# Patient Record
Sex: FEMALE
[De-identification: names, ages, dates, MRNs, and addresses within clinical notes are randomized per-mention and may not be internally consistent; named-entity substitution may affect disease eponyms.]

---

## 2020-06-28 ENCOUNTER — FORM ENCOUNTER (OUTPATIENT)
Age: 45
End: 2020-06-28

## 2020-08-31 ENCOUNTER — FORM ENCOUNTER (OUTPATIENT)
Age: 45
End: 2020-08-31

## 2020-10-29 ENCOUNTER — FORM ENCOUNTER (OUTPATIENT)
Age: 45
End: 2020-10-29

## 2021-11-19 PROBLEM — Z00.00 ENCOUNTER FOR PREVENTIVE HEALTH EXAMINATION: Status: ACTIVE | Noted: 2021-11-19

## 2022-01-03 ENCOUNTER — APPOINTMENT (OUTPATIENT)
Dept: OBGYN | Facility: CLINIC | Age: 47
End: 2022-01-03

## 2022-04-18 ENCOUNTER — APPOINTMENT (OUTPATIENT)
Dept: OBGYN | Facility: CLINIC | Age: 47
End: 2022-04-18

## 2023-01-10 ENCOUNTER — APPOINTMENT (OUTPATIENT)
Dept: OBGYN | Facility: CLINIC | Age: 48
End: 2023-01-10
Payer: COMMERCIAL

## 2023-01-10 ENCOUNTER — NON-APPOINTMENT (OUTPATIENT)
Age: 48
End: 2023-01-10

## 2023-01-10 VITALS
SYSTOLIC BLOOD PRESSURE: 122 MMHG | HEART RATE: 71 BPM | BODY MASS INDEX: 30.05 KG/M2 | HEIGHT: 64 IN | WEIGHT: 176 LBS | DIASTOLIC BLOOD PRESSURE: 81 MMHG

## 2023-01-10 DIAGNOSIS — Z01.419 ENCOUNTER FOR GYNECOLOGICAL EXAMINATION (GENERAL) (ROUTINE) W/OUT ABNORMAL FINDINGS: ICD-10-CM

## 2023-01-10 DIAGNOSIS — R39.15 URGENCY OF URINATION: ICD-10-CM

## 2023-01-10 PROCEDURE — 99386 PREV VISIT NEW AGE 40-64: CPT

## 2023-01-10 NOTE — HISTORY OF PRESENT ILLNESS
[FreeTextEntry1] : here for annual\par has urinary frequency , urgency, nocturia\par \par last tierney visit\par \par Patient\par Name	NANCI DAVILA (46yo, F) ID# [id number]	Appt. Date/Time	2020 02:10PM\par 	1975	Service Dept.	[facility]K OFFICE\par Provider	TRAM MULLEN MD\par Insurance	\par Med Primary: [facility] - [employer]\par Insurance # : [id number]\par Policy/Group # : [id number]\par Prescription: [employer] - This member could not be found in the payer's files. Please verify coverage and all member demographic information. details\par Chief Complaint\par F/U to surgery\par Patient's Care Team\par Primary Care Provider: KARMEN CALABRESE MD: [address] [place] [address], Ph [phone], Fax [phone] NPI: [id number]\par Patient's Pharmacies\par [facility] [place] [address] ([place]): [address] [place] [address], Ph [phone], Fax [phone]\par Vitals\par Ht:	5 ft 4 in 2020 03:03 pm\par BP:	120/76 sitting 2020 03:04 pm\par Allergies\par Reviewed Allergies\par PENICILLINS	\par Medications\par No medications reported\par Problems\par Reviewed Problems\par Family History\par Reviewed Family History\par Mother	- Diabetes mellitus\par  	- Hypertensive disorder\par  	- Hypercholesterolemia\par Father	- Glaucoma\par Social History\par Reviewed Social History\par Tobacco Smoking Status: Never smoker\par Most Recent Tobacco Use Screenin2020\par Surgical History\par Surgical History not reviewed (last reviewed 2020)\par none\par GYN History\par Reviewed GYN History\par Duration of Flow (days): 7 (Notes: 3-4 days bad pain).\par LMP: Definite.\par Flow: Heavy (Notes: 3-4).\par Date of LMP: 08/10/2020 (Notes: 10/30/2020).\par Obstetric History\par Reviewed Obstetric History\par TOTAL	FULL	PRE	AB. I	AB. S	ECTOPICS	MULTIPLE	LIVING\par 2							2\par 2 's\par hyperemesis\par 5lb 4 oz\par 6 lbs\par Past Medical History\par Past Medical History not reviewed (last reviewed 2020)\par Anemia: Y - iron def\par Anxiety Disorder: Y\par Screening\par None recorded.\par HPI\par POST OP\par \par Physical Exam\par Patient is a 45-year-old female.\par \par General Appearance: General Appearance: healthy-appearing, well-nourished, no acute distress, and ambulating well.\par \par Abdomen: Inspection of Incision Site: clean, dry, intact, and tender (AT UMBILICUS). Auscultation/Inspection/Palpation: no tenderness, hepatomegaly, splenomegaly, masses, or CVA tenderness and soft, non-distended, and bowel sounds present. Hernia: none palpated.\par \par VAG EXAM:\par \par HEALING WELL, SUTURES INTACT\par \par Assessment / Plan\par 1. Postoperative visit -\par STILL VERY TENDER\par \par REST ADDITIONAL 4 WEEKS\par \par RETURN 4 WEEKS TO REASSESS\par \par Z09: Encounter for follow-up examination after completed treatment for conditions other than malignant neoplasm\par \par \par Return to Office\par to see Tram Mullen MD at [facility]K OFFICE on or around 2020\par Encounter Sign-Off\par Encounter signed-off by Tram Mullen MD, 2020.\par Encounter performed and documented by Tram Mullen MD\par Encounter reviewed & signed by Tram Mullen MD on 2020 at 3:22pm

## 2023-01-16 LAB
BACTERIA UR CULT: NORMAL
CYTOLOGY CVX/VAG DOC THIN PREP: NORMAL
HPV HIGH+LOW RISK DNA PNL CVX: NOT DETECTED